# Patient Record
Sex: FEMALE
[De-identification: names, ages, dates, MRNs, and addresses within clinical notes are randomized per-mention and may not be internally consistent; named-entity substitution may affect disease eponyms.]

---

## 2021-06-25 PROBLEM — Z00.00 ENCOUNTER FOR PREVENTIVE HEALTH EXAMINATION: Status: ACTIVE | Noted: 2021-06-25

## 2021-08-03 ENCOUNTER — APPOINTMENT (OUTPATIENT)
Dept: NEUROSURGERY | Facility: CLINIC | Age: 21
End: 2021-08-03

## 2021-10-28 ENCOUNTER — APPOINTMENT (OUTPATIENT)
Dept: NEUROSURGERY | Facility: CLINIC | Age: 21
End: 2021-10-28
Payer: COMMERCIAL

## 2021-10-28 DIAGNOSIS — H93.A9 PULSATILE TINNITUS, UNSPECIFIED EAR: ICD-10-CM

## 2021-10-28 DIAGNOSIS — Z78.9 OTHER SPECIFIED HEALTH STATUS: ICD-10-CM

## 2021-10-28 PROCEDURE — EDU01: CPT

## 2021-10-28 NOTE — PHYSICAL EXAM
[General Appearance - Alert] : alert [General Appearance - In No Acute Distress] : in no acute distress [General Appearance - Well-Appearing] : healthy appearing [] : normal voice and communication [Oriented To Time, Place, And Person] : oriented to person, place, and time [Person] : oriented to person [Time] : oriented to time [Place] : oriented to place

## 2021-11-01 NOTE — REASON FOR VISIT
[Home] : at home, [unfilled] , at the time of the visit. [Medical Office: ([facility])___] : at the medical office located in  [Verbal consent obtained from patient] : the patient, [unfilled] [New Patient Visit] : a new patient visit [FreeTextEntry1] : Pulsatile Tinnitus

## 2021-11-01 NOTE — REVIEW OF SYSTEMS
[As Noted in HPI] : as noted in HPI [Negative] : Heme/Lymph [de-identified] : Headaches [FreeTextEntry3] : Blurry Vision [FreeTextEntry4] : Pulsatile Tinnitus

## 2021-11-01 NOTE — HISTORY OF PRESENT ILLNESS
[de-identified] : Ms. VILLAFANA is a pleasant 21 year old female who presents today with a chief complaint of bilateral (L>R) ear pulsatile tinnitus.  It first started over 4 years ago.  Since that time it has been getting progressively worse.  She recently found out she is 11 weeks pregnant and feels that the sound is much louder.  It is described as a constant, whooshing sound that is in sync with her pulse.  Pressing on the side of her neck does NOT improve the sound.  Pregnancy makes it worse.  She has been seen by ENT in the past and had a normal hearing test as per patient.  She denies any headaches,\par \par She reports worsening blurry vision over the last 2 years, has not seen ophthalmology.\par \par She has constant headaches at the base of her skull with intermittent "pressure behind her eyes".  She does not think it is worse with laying flat.

## 2021-11-01 NOTE — ASSESSMENT
[FreeTextEntry1] : Impression:\par Bilateral Pulsatile Tinnitus (L>R)\par Constant Headaches, Not Positional\par Blurry Vision over the last 2 years\par \par I cannot open her MRA neck that she sent.\par \par She needs complete cerebrovascular imaging of the head, she is 11 weeks pregnant, will order imaging without contrast.\par \par \par Plan:\par See Ophthalmology\par MRA and MRV Head wo \par Follow Up with Me After Imaging

## 2024-01-26 ENCOUNTER — TRANSCRIPTION ENCOUNTER (OUTPATIENT)
Age: 24
End: 2024-01-26